# Patient Record
Sex: FEMALE | Race: WHITE | URBAN - METROPOLITAN AREA
[De-identification: names, ages, dates, MRNs, and addresses within clinical notes are randomized per-mention and may not be internally consistent; named-entity substitution may affect disease eponyms.]

---

## 2017-10-04 ENCOUNTER — HOSPITAL ENCOUNTER (INPATIENT)
Age: 32
LOS: 2 days | Discharge: HOME | DRG: 885 | End: 2017-10-06
Attending: PSYCHIATRY & NEUROLOGY | Admitting: PSYCHIATRY & NEUROLOGY

## 2017-10-04 PROBLEM — F23 BRIEF PSYCHOTIC DISORDER (HCC): Status: ACTIVE | Noted: 2017-10-04

## 2017-10-04 PROBLEM — F23 BRIEF PSYCHOTIC DISORDER (HCC): Chronic | Status: ACTIVE | Noted: 2017-10-04

## 2017-10-04 PROCEDURE — 1240000000 HC EMOTIONAL WELLNESS R&B

## 2017-10-04 PROCEDURE — 6370000000 HC RX 637 (ALT 250 FOR IP): Performed by: PSYCHIATRY & NEUROLOGY

## 2017-10-04 RX ORDER — TRAZODONE HYDROCHLORIDE 50 MG/1
50 TABLET ORAL NIGHTLY PRN
Status: DISCONTINUED | OUTPATIENT
Start: 2017-10-04 | End: 2017-10-06 | Stop reason: HOSPADM

## 2017-10-04 RX ORDER — MAGNESIUM HYDROXIDE/ALUMINUM HYDROXICE/SIMETHICONE 120; 1200; 1200 MG/30ML; MG/30ML; MG/30ML
30 SUSPENSION ORAL 3 TIMES DAILY PRN
Status: DISCONTINUED | OUTPATIENT
Start: 2017-10-04 | End: 2017-10-06 | Stop reason: HOSPADM

## 2017-10-04 RX ORDER — GABAPENTIN 300 MG/1
300 CAPSULE ORAL 3 TIMES DAILY
Status: DISCONTINUED | OUTPATIENT
Start: 2017-10-04 | End: 2017-10-06 | Stop reason: HOSPADM

## 2017-10-04 RX ORDER — HYDROXYZINE HYDROCHLORIDE 25 MG/1
25 TABLET, FILM COATED ORAL 3 TIMES DAILY PRN
Status: DISCONTINUED | OUTPATIENT
Start: 2017-10-04 | End: 2017-10-06 | Stop reason: HOSPADM

## 2017-10-04 RX ORDER — ACETAMINOPHEN 325 MG/1
325 TABLET ORAL EVERY 4 HOURS PRN
Status: DISCONTINUED | OUTPATIENT
Start: 2017-10-04 | End: 2017-10-06 | Stop reason: HOSPADM

## 2017-10-04 RX ADMIN — GABAPENTIN 300 MG: 300 CAPSULE ORAL at 17:13

## 2017-10-04 RX ADMIN — VORTIOXETINE 10 MG: 10 TABLET, FILM COATED ORAL at 17:12

## 2017-10-04 ASSESSMENT — SLEEP AND FATIGUE QUESTIONNAIRES
DIFFICULTY STAYING ASLEEP: YES
DIFFICULTY FALLING ASLEEP: YES
SLEEP PATTERN: DIFFICULTY FALLING ASLEEP;DIFFICULTY ARISING;RESTLESSNESS
DO YOU USE A SLEEP AID: NO
DO YOU HAVE DIFFICULTY SLEEPING: YES
DIFFICULTY ARISING: YES
RESTFUL SLEEP: NO
AVERAGE NUMBER OF SLEEP HOURS: 4

## 2017-10-04 ASSESSMENT — LIFESTYLE VARIABLES: HISTORY_ALCOHOL_USE: NO

## 2017-10-04 ASSESSMENT — PATIENT HEALTH QUESTIONNAIRE - PHQ9: SUM OF ALL RESPONSES TO PHQ QUESTIONS 1-9: 10

## 2017-10-04 NOTE — IP AVS SNAPSHOT
After Visit Summary    This summary was created for you. Thank you for entrusting your care to us. The following information includes details about your hospital/visit stay along with steps you should take to help with your recovery once you leave the hospital.  In this packet, you will find information about the topics listed below:    · Instructions about your medications including a list of your home medications  · A summary of your hospital visit  · Follow-up appointments once you have left the hospital  · Your care plan at home      You may receive a survey regarding the care you received during your stay. Your input is valuable to us. We encourage you to complete and return your survey in the envelope provided. We hope you will choose us in the future for your healthcare needs. Basic Information     Date Of Birth Sex Race Ethnicity Preferred Language    1985 Female White Non-/Non  English      Vital Signs     Blood Pressure Pulse Temperature Respirations Height Weight    116/95 96 97.9 °F (36.6 °C) (Axillary) 14 5' 7\" (1.702 m) 158 lb (71.7 kg)    Body Mass Index Smoking Status                24.75 kg/m2 Never Smoker          Care Provided at:     Name Address Phone       [address] [facility] [phone]       Psychiatric Advance Directive          Most Recent Value    Psychiatric Advance Directive  No    Power of  for [place]           Your Visit    Here you will find information about your visit, including the reason for your visit. Please take this sheet with you when you visit your doctor or other health care provider in the future. It will help determine the best possible medical care for you at that time. If you have any questions once you leave the hospital, please call the department phone number listed below.         Why you were here     Your primary diagnosis was:  Brief Psychotic Disorder Visit Information     Date & Time Provider Department Dept. Phone    10/4/2017 Edilson Hodges  [facility] [phone]       Follow-up Appointments    Below is a list of your follow-up and future appointments. This may not be a complete list as you may have made appointments directly with providers that we are not aware of or your providers may have made some for you. Please call your providers to confirm appointments. It is important to keep your appointments. Please bring your current insurance card, photo ID, co-pay, and all medication bottles to your appointment. If self-pay, payment is expected at the time of service. Follow-up Information     Follow up with Cheryl Hillman . Specialty:  Family Medicine    Contact information:    [address], [address]  [place]  [phone]          Follow up with [employer]  On 10/16/2017. Why:  @ 9 am for med check up    Contact information:    [place] [address]  [address] [phone]          Follow up with [employer] On 10/26/2017. Why:  @ 630 PM for psych evaul with Dr Kori Garland information:    [place] [place]  [phone]  [phone]           Care Plan Once You Return Home    This section includes instructions you will need to follow once you leave the hospital.  Your care team will discuss these with you, so you and those caring for you know how to best care for your health needs at home. This section may also include educational information about certain health topics that may be of help to you. Diet Instructions     ? Good nutrition is important when healing from an illness, injury, or surgery. Follow any nutrition recommendations given to you during your hospital stay. ? If you were given an oral nutrition supplement while in the hospital, continue to take this supplement at home.   You can take it with meals, provider(s). I had the opportunity to ask questions regarding this information. I understand I should dispose of my armband safely at home to protect my health information. A complete copy of the After Visit Summary has been given to me, the patient and/or responsible adult. Patient Signature/Responsible Adult:  ____________________________________________________________    Date/Time:  ____________________________________________________________                 Clinician Signature:  ____________________________________________________________    Date/Time:  ____________________________________________________________                 Transmitted to next level of Care:  ____________________________________________________________    Date/Time/Signature:  ____________________________________________________________            After Visit Summary  (Discharge Instructions)    Medication List for Home    Based on the information you provided to us as well as any changes during this visit, the following is your updated medication list.  Compare this with your prescription bottles at home. If you have any questions or concerns, contact your primary care physician's office. Daily Medication List (This medication list can be shared with any healthcare provider who is helping you manage your medications)      There are NEW medications for you. START taking them after you leave the hospital        Last Dose    Next Dose Due AM NOON PM NIGHT    Cariprazine HCl 3 MG Caps   Take 1 capsule by mouth daily   Notes to Patient:   To stabilize your mood and clear your thoughts                3 mg on 10/6/2017  8:49 AM     10/7/2016    9 AM                   gabapentin 300 MG capsule   Commonly known as:  NEURONTIN   Take 1 capsule by mouth 3 times daily   Notes to Patient:  For anxiety and to stabilize your mood                300 mg on 10/6/2017  8:46 AM     10/6/2017               2 PM VORTIoxetine 10 MG Tabs tablet   Commonly known as:  TRINTELLIX   Take 10 mg by mouth daily   Notes to Patient:  depression                10 mg on 10/6/2017  8:46 AM     10/7/2017    9 AM                     These are medications you told us you were taking at home, CONTINUE taking them after you leave the hospital        Last Dose    Next Dose Due AM NOON PM NIGHT    albuterol sulfate  (90 Base) MCG/ACT inhaler   Inhale 2 puffs into the lungs every 6 hours as needed for Wheezing   Notes to Patient:  wheezing                  You take this only as you need to                             Where to Get Your Medications      You can get these medications from any pharmacy     Bring a paper prescription for each of these medications     Cariprazine HCl 3 MG Caps    gabapentin 300 MG capsule    VORTIoxetine 10 MG Tabs tablet               Allergies as of 10/6/2017        Reactions    Maxalt [Rizatriptan Benzoate] Anaphylaxis      Immunizations as of 10/6/2017     No immunizations on file. [facility] Signup     [facility] allows you to send messages to your doctor, view your test results, renew your prescriptions, schedule appointments, view visit notes, and more. How Do I Sign Up? 1. In your Internet browser, go to [email or web address]  2. Click on the Sign Up Now link in the Sign In box. You will see the New Member Sign Up page. 3. Enter your [facility] Access Code exactly as it appears below. You will not need to use this code after youve completed the sign-up process. If you do not sign up before the expiration date, you must request a new code. [facility] Access Code: [email or web address]  Expires: 12/5/2017 10:59 AM    4. Enter your Social Security Number ([id number]) and Date of Birth (mm/dd/yyyy) as indicated and click Submit. You will be taken to the next sign-up page. 5. Create a [facility] ID.  This will be your [facility] login ID and cannot be changed, so think of one that is secure and easy to remember. 6. Create a [employer] password. You can change your password at any time. 7. Enter your Password Reset Question and Answer. This can be used at a later time if you forget your password. 8. Enter your e-mail address. You will receive e-mail notification when new information is available in [address] 9. Click Sign Up. You can now view your medical record. Additional Information  If you have questions, please contact the physician practice where you receive care. Remember, [employer] is NOT to be used for urgent needs. For medical emergencies, dial 911. For questions regarding your [employer] account call [phone]. If you have a clinical question, please call your doctor's office. View your information online  ? Review your current list of  medications, immunization, and allergies. ? Review your future test results online . ? Review your discharge instructions provided by your caregivers at discharge    Certain functionality such as prescription refills, scheduling appointments or sending messages to your provider are not activated if your provider does not use [employer] in his/her office    For questions regarding your [employer] account call [phone]. If you have a clinical question, please call your doctor's office.

## 2017-10-04 NOTE — BH NOTE
`[facility]  Admission Note     Admission Type:   Admission Type: Voluntary    Reason for admission:  Reason for Admission: Paranoid, delusional, poor sleep, poor appetite, anxiety    PATIENT STRENGTHS:  Strengths: Communication, No significant Physical Illness    Patient Strengths and Limitations:  Limitations: Difficulty problem solving/relies on others to help solve problems, Difficult relationships / poor social skills    Addictive Behavior:   Addictive Behavior  In the past 3 months, have you felt or has someone told you that you have a problem with:  : None  Do you have a history of Chemical Use?: No  Do you have a history of Alcohol Use?: No  Do you have a history of Street Drug Abuse?: No  Histroy of Prescripton Drug Abuse?: No    Medical Problems:   Past Medical History:   Diagnosis Date    Asthma        Status EXAM:  Status and Exam  Normal: No  Facial Expression: Worried, Sad  Affect: Blunt  Level of Consciousness: Alert  Mood:Normal: No  Mood: Depressed, Anxious, Helpless, Guilty  Motor Activity:Normal: Yes  Interview Behavior: Cooperative  Preception: [place] to Person, [place] to Time, [place] to Place, [place] to Situation  Attention:Normal: Yes  Thought Processes: Circumstantial  Thought Content:Normal: No  Thought Content: Paranoia, Preoccupations  Delusions: No  Memory:Normal: Yes  Insight and Judgment: No  Insight and Judgment: Poor Judgment  Present Suicidal Ideation: No  Present Homicidal Ideation: No    Tobacco Screening:  Practical Counseling, on admission, james X, if applicable and completed (first 3 are required if patient doesn't refuse):            ( )  Recognizing danger situations (included triggers and roadblocks)                    ( )  Coping skills (new ways to manage stress, exercise, relaxation techniques, changing routine, distraction)                                                           ( )  Basic information about quitting (benefits of quitting, techniques in how to quit, available resources  ( ) Referral for counseling faxed to [facility]                                           ( ) Patient refused counseling  (x ) Patient has not smoked in the last 30 days      Pt admitted with followings belongings:  Dentures: None  Vision - Corrective Lenses: None  Hearing Aid: None  Jewelry: None  Body Piercings Removed: N/A  Clothing: Footwear, Shirt, Socks, Undergarments (Comment)  Were All Patient Medications Collected?: Not Applicable  Other Valuables: None     Valuables sent home with none. Valuables placed in safe in security envelope, number:  none. Patient's home medications were continued. Patient oriented to surroundings and program expectations and copy of patient rights given. Received admission packet:  yes  Consents reviewed, signed Voluntary. Refused all others                     Patient verbalize understanding:  yes.     Patient education on precautions: yes                  Chad Servin RN

## 2017-10-05 LAB
ABSOLUTE EOS #: 0 K/UL (ref 0–0.4)
ABSOLUTE LYMPH #: 1.5 K/UL (ref 1–4.8)
ABSOLUTE MONO #: 0.5 K/UL (ref 0.1–1.3)
ALBUMIN SERPL-MCNC: 3.8 G/DL (ref 3.5–5.2)
ALBUMIN/GLOBULIN RATIO: NORMAL (ref 1–2.5)
ALP BLD-CCNC: 67 U/L (ref 35–104)
ALT SERPL-CCNC: 22 U/L (ref 5–33)
ANION GAP SERPL CALCULATED.3IONS-SCNC: 14 MMOL/L (ref 9–17)
AST SERPL-CCNC: 19 U/L
BASOPHILS # BLD: 0 %
BASOPHILS ABSOLUTE: 0 K/UL (ref 0–0.2)
BILIRUB SERPL-MCNC: 0.34 MG/DL (ref 0.3–1.2)
BUN BLDV-MCNC: 12 MG/DL (ref 6–20)
BUN/CREAT BLD: NORMAL (ref 9–20)
CALCIUM SERPL-MCNC: 8.9 MG/DL (ref 8.6–10.4)
CHLORIDE BLD-SCNC: 102 MMOL/L (ref 98–107)
CHOLESTEROL/HDL RATIO: 5.4
CHOLESTEROL: 151 MG/DL
CO2: 24 MMOL/L (ref 20–31)
CREAT SERPL-MCNC: 0.57 MG/DL (ref 0.5–0.9)
DIFFERENTIAL TYPE: ABNORMAL
EKG ATRIAL RATE: 84 BPM
EKG P AXIS: 59 DEGREES
EKG P-R INTERVAL: 160 MS
EKG Q-T INTERVAL: 364 MS
EKG QRS DURATION: 86 MS
EKG QTC CALCULATION (BAZETT): 430 MS
EKG R AXIS: 83 DEGREES
EKG T AXIS: 32 DEGREES
EKG VENTRICULAR RATE: 84 BPM
EOSINOPHILS RELATIVE PERCENT: 1 %
GFR AFRICAN AMERICAN: >60 ML/MIN
GFR NON-AFRICAN AMERICAN: >60 ML/MIN
GFR SERPL CREATININE-BSD FRML MDRD: NORMAL ML/MIN/{1.73_M2}
GFR SERPL CREATININE-BSD FRML MDRD: NORMAL ML/MIN/{1.73_M2}
GLUCOSE BLD-MCNC: 96 MG/DL (ref 70–99)
HCG QUALITATIVE: NEGATIVE
HCT VFR BLD CALC: 36.9 % (ref 36–46)
HDLC SERPL-MCNC: 28 MG/DL
HEMOGLOBIN: 12.2 G/DL (ref 12–16)
LDL CHOLESTEROL: 99 MG/DL (ref 0–130)
LYMPHOCYTES # BLD: 17 %
MCH RBC QN AUTO: 26 PG (ref 26–34)
MCHC RBC AUTO-ENTMCNC: 32.9 G/DL (ref 31–37)
MCV RBC AUTO: 78.9 FL (ref 80–100)
MONOCYTES # BLD: 6 %
PDW BLD-RTO: 16.7 % (ref 11.5–14.9)
PLATELET # BLD: 334 K/UL (ref 150–450)
PLATELET ESTIMATE: ABNORMAL
PMV BLD AUTO: 7.4 FL (ref 6–12)
POTASSIUM SERPL-SCNC: 4 MMOL/L (ref 3.7–5.3)
RBC # BLD: 4.68 M/UL (ref 4–5.2)
RBC # BLD: ABNORMAL 10*6/UL
SEG NEUTROPHILS: 76 %
SEGMENTED NEUTROPHILS ABSOLUTE COUNT: 6.8 K/UL (ref 1.3–9.1)
SODIUM BLD-SCNC: 140 MMOL/L (ref 135–144)
T3 TOTAL: 123 NG/DL (ref 80–200)
T4 TOTAL: 7.2 UG/DL (ref 4.5–12)
TOTAL PROTEIN: 7.1 G/DL (ref 6.4–8.3)
TRIGL SERPL-MCNC: 120 MG/DL
TSH SERPL DL<=0.05 MIU/L-ACNC: 1.35 MIU/L (ref 0.3–5)
VLDLC SERPL CALC-MCNC: ABNORMAL MG/DL (ref 1–30)
WBC # BLD: 8.9 K/UL (ref 3.5–11)
WBC # BLD: ABNORMAL 10*3/UL

## 2017-10-05 PROCEDURE — 84436 ASSAY OF TOTAL THYROXINE: CPT

## 2017-10-05 PROCEDURE — 6370000000 HC RX 637 (ALT 250 FOR IP): Performed by: PSYCHIATRY & NEUROLOGY

## 2017-10-05 PROCEDURE — 80061 LIPID PANEL: CPT

## 2017-10-05 PROCEDURE — 85025 COMPLETE CBC W/AUTO DIFF WBC: CPT

## 2017-10-05 PROCEDURE — 80053 COMPREHEN METABOLIC PANEL: CPT

## 2017-10-05 PROCEDURE — 84703 CHORIONIC GONADOTROPIN ASSAY: CPT

## 2017-10-05 PROCEDURE — 84443 ASSAY THYROID STIM HORMONE: CPT

## 2017-10-05 PROCEDURE — 84480 ASSAY TRIIODOTHYRONINE (T3): CPT

## 2017-10-05 PROCEDURE — 93005 ELECTROCARDIOGRAM TRACING: CPT

## 2017-10-05 PROCEDURE — 1240000000 HC EMOTIONAL WELLNESS R&B

## 2017-10-05 PROCEDURE — 36415 COLL VENOUS BLD VENIPUNCTURE: CPT

## 2017-10-05 RX ADMIN — GABAPENTIN 300 MG: 300 CAPSULE ORAL at 15:03

## 2017-10-05 RX ADMIN — HYDROXYZINE HYDROCHLORIDE 25 MG: 25 TABLET, FILM COATED ORAL at 15:03

## 2017-10-05 RX ADMIN — GABAPENTIN 300 MG: 300 CAPSULE ORAL at 08:30

## 2017-10-05 RX ADMIN — CARIPRAZINE 3 MG: 3 CAPSULE, GELATIN COATED ORAL at 08:30

## 2017-10-05 RX ADMIN — VORTIOXETINE 10 MG: 10 TABLET, FILM COATED ORAL at 08:30

## 2017-10-05 RX ADMIN — GABAPENTIN 300 MG: 300 CAPSULE ORAL at 20:39

## 2017-10-05 ASSESSMENT — SLEEP AND FATIGUE QUESTIONNAIRES
DIFFICULTY FALLING ASLEEP: NO
RESTFUL SLEEP: YES
DIFFICULTY STAYING ASLEEP: NO
DIFFICULTY ARISING: NO
AVERAGE NUMBER OF SLEEP HOURS: 7
DO YOU HAVE DIFFICULTY SLEEPING: NO
DO YOU USE A SLEEP AID: NO

## 2017-10-05 ASSESSMENT — PATIENT HEALTH QUESTIONNAIRE - PHQ9: SUM OF ALL RESPONSES TO PHQ QUESTIONS 1-9: 0

## 2017-10-05 ASSESSMENT — LIFESTYLE VARIABLES: HISTORY_ALCOHOL_USE: NO

## 2017-10-05 NOTE — H&P
HISTORY and [facility] [address]       NAME:  Emmy Bumpers  MRN: [id number]   YOB: 1985   Date: 10/5/2017   Age: 27 y.o. Gender: female     COMPLAINT AND PRESENT HISTORY:      Emmy Bumpers is 27 y.o.,  female, admitted because of brief psychotic disorder. Pt denies suicidal ideation, EHR states pt has SI with no specific plan. Pt denies any homicidal ideation. Pt denies a history of previous suicide attempts. Pt feels hopeless, helpless, worthless, lack of interest, loss of energy. Poor insight. Pt has poor sleep, loss of appetite, denies having poor concentration and memory. Pt denies any current auditory, visual or tactile hallucinations. Patients current stressors include work stress. Patient denies any current alcohol or substance abuse. Patient lives with , 3 children, and her mother. Pt states she has been compliant with her medications.     DIAGNOSTIC RESULTS   Labs:  CBC:   Recent Labs      10/05/17   0734   WBC  8.9   HGB  12.2   PLT  334     BMP:    Recent Labs      10/05/17   0734   NA  140   K  4.0   CL  102   CO2  24   BUN  12   CREATININE  0.57   GLUCOSE  96     Hepatic:   Recent Labs      10/05/17   0734   AST  19   ALT  22   BILITOT  0.34   ALKPHOS  79     Lipids:   Recent Labs      10/05/17   0734   CHOL  151   HDL  28*     PAST MEDICAL HISTORY     Past Medical History:   Diagnosis Date    Asthma     Migraine        Pt denies any history of Diabetes mellitus type 2, hypertension, stroke, heart disease, COPD, GERD, HLD, Cancer, Seizures,Thyroid disease, Kidney Disease, Hepatitis, TB.    SURGICAL HISTORY       Past Surgical History:   Procedure Laterality Date    CARPAL TUNNEL RELEASE Left 09/26/2016    [facility]     TUBAL LIGATION  2013       FAMILY HISTORY       Family History   Problem Relation Age of Onset    Hypertension Mother        SOCIAL HISTORY       Social History     Social History    Marital status: Legally      Spouse name: N/A    Number of children: N/A    Years of education: N/A     Social History Main Topics    Smoking status: Never Smoker    Smokeless tobacco: Never Used    Alcohol use Yes      Comment: occasionally    Drug use: No    Sexual activity: Yes     Other Topics Concern    None     Social History Narrative           REVIEW OF SYSTEMS      Allergies   Allergen Reactions    Maxalt [Rizatriptan Benzoate] Anaphylaxis       No current facility-administered medications on file prior to encounter. Current Outpatient Prescriptions on File Prior to Encounter   Medication Sig Dispense Refill    albuterol sulfate  (90 BASE) MCG/ACT inhaler Inhale 2 puffs into the lungs every 6 hours as needed for Wheezing                        General health:  Fairly good. No fever or chills. Skin:  No itching, redness or rash. Head, eyes, ears, nose, throat:  No headache, epistaxis, rhinorrhea hearing loss or sore throat. Neck:  No pain, stiffness or masses. Cardiovascular/Respiratory system:  No chest pain, palpitation, shortness of breath, coughing or expectoration. Gastrointestinal tract: No abdominal pain, nausea, vomiting, diarrhea or constipation. Genitourinary:  No burning on micturition. No hesitancy, urgency, frequency or discoloration of urine. Locomotor:  No bone or joint pains. No swelling or deformities. Neuropsychiatric:  See HPI. GENERAL PHYSICAL EXAM:     Vitals: /68  Pulse 79  Temp 97.5 °F (36.4 °C) (Axillary)   Resp 16  Ht 5' 7\" (1.702 m)  Wt 158 lb (71.7 kg)  BMI 24.75 kg/m2 Body mass index is 24.75 kg/(m^2). Pt was examined with a nurse present in the room. GENERAL APPEARANCE:  Victor Hugo Oakley is 27 y.o.,  female, mildly obese, nourished, conscious, alert. Does not appear to be distress or pain at this time. SKIN:  Warm, dry, no cyanosis or jaundice.     HEAD:  Normocephalic, atraumatic, no swelling or tenderness. Opening and closing mouth (CN V). EYES:  Pupils equal, reactive to light, Conjunctiva is clear, EOMs intact lorenzo (CN II, III, IV, VI), eyelids WNL. EARS:  No discharge, no marked hearing loss (CN VIII). NOSE:  No rhinorrhea, epistaxis or septal deformity. THROAT:  Not congested. No ulceration bleeding or discharge. NECK:  No stiffness, trachea central.  No palpable masses or L.N.      CHEST:  Symmetrical and equal on expansion. HEART:  Regular rate and rhythm. S1 > S2, No audible murmurs or gallops. LUNGS:  Equal on expansion, normal breath sounds. No adventitious sounds. ABDOMEN:  Obese. Soft on palpation. No localized tenderness. No guarding or rigidity. No palpable organomegaly. LYMPHATICS:  No palpable lymphadenopathy. LOCOMOTOR, BACK AND SPINE:  No tenderness or deformities. Pt able to rotate head and shrug shoulders (CN XI). EXTREMITIES:  Symmetrical, no pedal edema. Tianas sign negative. No discoloration or ulcerations. NEUROLOGIC:  The patient is conscious, alert, oriented, Gait and balance WNL. No apparent focal sensory deficits. No motor deficits, muscle strength equal Lorenzo. No facial droop (CN VII), tongue protrudes centrally (CN XII), no slurring of the speech (CN X). CN I and IX not tested.             PROVISIONAL DIAGNOSES:      Principal Problem:    Brief psychotic disorder      Catie Barrios PA-C on 10/5/2017 at 4:09 PM

## 2017-10-05 NOTE — BH NOTE
MD Fany   300 mg at 10/05/17 0830    Cariprazine HCl CAPS 3 mg  3 mg Oral Daily Ashley Grewal MD   3 mg at 10/05/17 0830     [unfilled]    H&P  Labs  Encourage to attend groups  Supportive therapy  ELOS: 5-7 days      Electronically signed by Ashley Grewal MD on 10/5/2017 at 9:21 AM

## 2017-10-05 NOTE — PROGRESS NOTES
Nutrition Assessment    Type and Reason for Visit: Positive Nutrition Screen (Unplanned weight loss and decreased appetite)    Nutrition Recommendations: Continue General diet. Consider oral nutrition supplement if po intakes are less than 50% at meals. Malnutrition Assessment:  · Malnutrition Status: Mild Malnutrition  · Context: Acute illness or injury  · Findings of the 6 clinical characteristics of malnutrition (Minimum of 2 out of 6 clinical characteristics is required to make the diagnosis of moderate or severe Protein Calorie Malnutrition based on AND/ASPEN Guidelines):  1. Energy Intake-Less than or equal to 50%, greater than or equal to 5 days    2. Weight Loss-7.5% loss or greater, in 1 year  3. Fat Loss-No significant subcutaneous fat loss,    4. Muscle Loss-No significant muscle mass loss,    5. Fluid Accumulation-No significant fluid accumulation, Extremities  6.  Strength-Not measured    Nutrition Diagnosis:   · Problem: Predicted suboptimal energy intake  · Etiology: related to Psychological cause/life stress     Signs and symptoms:  as evidenced by Diet history of poor intake    Nutrition Assessment:  · Subjective Assessment: Patient reports decreased appetite and sleep related to increased anxiety. · Wound Type: None  · Current Nutrition Therapies:  · Oral Diet Orders: General   · Oral Diet intake: Unable to assess  · Anthropometric Measures:  · Ht: 5' 7\" (170.2 cm)   · Current Body Wt: 158 lb (71.7 kg)  · Admission Body Wt: 158 lb (71.7 kg)  · Usual Body Wt: 170 lb (77.1 kg) (10/10/16)  · % Weight Change: 7.6%,  1 year  · Ideal Body Wt: 135 lb (61.2 kg), % Ideal Body 117%  · BMI Classification: BMI 18.5 - 24.9 Normal Weight  · Comparative Standards (Estimated Nutrition Needs):  · Estimated Daily Total Kcal: 9303-0807  · Estimated Daily Protein (g): 57-72    Estimated Intake vs Estimated Needs: Intake Less Than Needs    Nutrition Risk Level:  Moderate    Nutrition Interventions:

## 2017-10-05 NOTE — BH NOTE
Psychoeducation Group Note    Date: 10/5  Start Time: 1600  End Time: 1635    Number Participants in Group: 9    Goal:  Patient will demonstrate increased interpersonal interaction   Topic:  Coping Skills     Discipline Responsible:   OT  AT   x Nsg.  RT  Other       Participation Level:     None  Minimal    Active Listener x Interactive    Monopolizing         Participation Quality:  x Appropriate  Inappropriate          Attentive        Intrusive          Sharing        Resistant          Supportive        Lethargic       Affective:   x Congruent  Incongruent  Blunted  Flat    Constricted  Anxious  Elated  Angry    Labile  Depressed  Other         Cognitive:  x Alert x Oriented PPTP     Concentration  G x F  P   Attention Span  G x F  P   Short-Term Memory  G x F  P   Long-Term Memory  G x F  P   ProblemSolving/  Decision Making  G x F  P   Ability to Process  Information  G x F  P      Contributing Factors             Delusional             Hallucinating             Flight of Ideas             Other:       Modes of Intervention:   Education x Support  Exploration    Clarifying  Problem Solving  Confrontation    Socialization  Limit Setting  Reality Testing   x Activity  Movement  Media    Other:            Response to Learning:   Able to verbalize current knowledge/experience   x Able to verbalize/acknowledge new learning    Able to retain information    Capable of insight    Able to change behavior    Progressing to goal    Other:        Comments:
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
## 2017-10-05 NOTE — BH NOTE
Psychoeducation Group Note    Date: 10/5/17  Start Time:  1000 End Time: 1030    Number Participants in Group:  7    Goal:  Patient will demonstrate increased interpersonal interaction   Topic:   Physical Wellness - benefits of physical activity    Discipline Responsible:   OT  AT   x Nsg.  RT  Other       Participation Level:     None  Minimal    Active Listener x Interactive    Monopolizing         Participation Quality:  x Appropriate  Inappropriate          Attentive        Intrusive          Sharing        Resistant          Supportive        Lethargic       Affective:   x Congruent  Incongruent  Blunted  Flat    Constricted  Anxious  Elated  Angry    Labile  Depressed  Other         Cognitive:  x Alert x Oriented PPTP     Concentration  G x F  P   Attention Span  G x F  P   Short-Term Memory  G x F  P   Long-Term Memory  G x F  P   ProblemSolving/  Decision Making  G x F  P   Ability to Process  Information  G x F  P      Contributing Factors             Delusional             Hallucinating             Flight of Ideas             Other:       Modes of Intervention:  x Education  Support  Exploration   x Clarifying  Problem Solving  Confrontation    Socialization  Limit Setting  Reality Testing   x Activity x Movement  Media    Other:            Response to Learning:  x Able to verbalize current knowledge/experience    Able to verbalize/acknowledge new learning    Able to retain information    Capable of insight    Able to change behavior    Progressing to goal    Other:

## 2017-10-05 NOTE — PLAN OF CARE
Problem: Altered Mood, Depressive Behavior  Goal: LTG-Able to verbalize and/or display a decrease in depressive symptoms  Outcome: Ongoing  Psychoeducation Group Note     Date: 10/5/17              Start Time: 1000                    End Time: 1045     Number Participants in Group:  11     Goal:  Patient will demonstrate increased interpersonal interaction. Topic:  Creative espressions     Discipline Responsible:    OT   AT   [facility]. X RT   Other         Participation Level:                  None   Minimal   x Active Listener x Interactive     Monopolizing             Participation Quality:  x Appropriate   Inappropriate   x       Attentive         Intrusive   x       Sharing         Resistant           Supportive         Lethargic         Affective:         x Congruent   Incongruent   Blunted   Flat     Constricted   Anxious   Elated   Angry     Labile   Depressed   Other   Bright         Cognitive:  x Alert x Oriented PPTP       Concentration x G   F   P   Attention Span x G   F   P   Short-Term Memory   G   F   P   Long-Term Memory   G   F   P   ProblemSolving/  Decision Making x G   F   P   Ability to Process  Information x G   F   P         Contributing Factors              Delusional              Hallucinating              Flight of Ideas              Other:         Modes of Intervention:    Education x Support   Exploration   x Clarifying x Problem Solving x Confrontation   x Socialization   Limit Setting   Reality Testing   x Activity   Movement   Media     Other:                  Response to Learning:  x Able to verbalize current knowledge/experience   x Able to verbalize/acknowledge new learning     Able to retain information     Capable of insight   x Able to change behavior   x Progressing to goal     Other:          Comments: pt attended group and participated.

## 2017-10-05 NOTE — PLAN OF CARE
Problem: Altered Mood, Depressive Behavior  Goal: LTG-Able to verbalize and/or display a decrease in depressive symptoms  Pt admits to some depression and anxiety and upset that she cant sign herself out here. Denies SI. Social with peers. Attended group.

## 2017-10-05 NOTE — PLAN OF CARE
Problem: Altered Mood, Depressive Behavior  Goal: LTG-Able to verbalize and/or display a decrease in depressive symptoms  [facility]  Initial Interdisciplinary Treatment Plan NO        Original treatment plan Date & Time: 10/5/17  Admission Type:  Admission Type: Voluntary     Reason for admission:   Reason for Admission: Paranoid, delusional, poor sleep, poor appetite, anxiety     Estimated Length of Stay:  5-7days  Estimated Discharge Date: to be determined by physician     PATIENT STRENGTHS:  Patient Strengths:Strengths: Communication, No significant Physical Illness  Patient Strengths and Limitations:Limitations: Difficulty problem solving/relies on others to help solve problems, Difficult relationships / poor social skills  Addictive Behavior: Addictive Behavior  In the past 3 months, have you felt or has someone told you that you have a problem with:  : None  Do you have a history of Chemical Use?: No  Do you have a history of Alcohol Use?: No  Do you have a history of Street Drug Abuse?: No  Histroy of Prescripton Drug Abuse?: No  Medical Problems:  Past Medical History:   Diagnosis Date    Asthma       Status EXAM:Status and Exam  Normal: No  Facial Expression: Flat  Affect: Blunt  Level of Consciousness: Alert  Mood:Normal: No  Mood: Depressed, Anxious  Motor Activity:Normal: Yes  Interview Behavior: Cooperative  Preception: [place] to Person, [place] to Time, [place] to Place, [place] to Situation  Attention:Normal: Yes  Thought Processes: Circumstantial  Thought Content:Normal: No  Thought Content: Preoccupations  Hallucinations: None  Delusions: No  Memory:Normal: Yes  Insight and Judgment: No  Insight and Judgment: Poor Judgment, Poor Insight  Present Suicidal Ideation: No  Present Homicidal Ideation: No     EDUCATION:   Learner Progress Toward Treatment Goals: reviewed group plans and strategies for care     Method:group therapy, medication compliance, individualized assessments and care

## 2017-10-05 NOTE — PLAN OF CARE
Problem: Altered Mood, Depressive Behavior  Goal: STG-Absence of Self Harm  Psychoeducation Group Note     Date:10/5/17               Start Time:  1330                   End Time: 9835     Number Participants in Group:  11/16     Goal:  Patient will demonstrate increased interpersonal interaction   Topic: Learning how to cope with life crises      Discipline Responsible:    OT   AT   [facility]. X RT   Other         Participation Level:                  None   Minimal   X Active Listener X Interactive     Monopolizing             Participation Quality:  X Appropriate   Inappropriate   X       Attentive         Intrusive           Sharing         Resistant           Supportive         Lethargic         Affective:         X Congruent   Incongruent   Blunted   Flat     Constricted   Anxious   Elated   Angry     Labile   Depressed   Other             Cognitive:  X Alert X Oriented PPTP       Concentration X G   F   P   Attention Span X G   F   P   Short-Term Memory   G   F   P   Long-Term Memory   G   F   P   ProblemSolving/  Decision Making X G   F   P   Ability to Process  Information X G   F   P         Contributing Factors              Delusional              Hallucinating              Flight of Ideas              Other:         Modes of Intervention:  X Education X Support   Exploration     Clarifying X Problem Solving   Confrontation   X Socialization   Limit Setting   Reality Testing   X Activity   Movement   Media     Other:                  Response to Learning:  X Able to verbalize current knowledge/experience     Able to verbalize/acknowledge new learning   X Able to retain information   X Capable of insight     Able to change behavior   X Progressing to goal     Other:          Comments:

## 2017-10-06 VITALS
TEMPERATURE: 97.9 F | BODY MASS INDEX: 24.8 KG/M2 | SYSTOLIC BLOOD PRESSURE: 116 MMHG | WEIGHT: 158 LBS | HEART RATE: 96 BPM | HEIGHT: 67 IN | DIASTOLIC BLOOD PRESSURE: 95 MMHG | RESPIRATION RATE: 14 BRPM

## 2017-10-06 PROCEDURE — 5130000000 HC BRIDGE APPOINTMENT: Performed by: COUNSELOR

## 2017-10-06 PROCEDURE — 6370000000 HC RX 637 (ALT 250 FOR IP): Performed by: PSYCHIATRY & NEUROLOGY

## 2017-10-06 RX ORDER — GABAPENTIN 300 MG/1
300 CAPSULE ORAL 3 TIMES DAILY
Qty: 90 CAPSULE | Refills: 0 | Status: SHIPPED | OUTPATIENT
Start: 2017-10-06 | End: 2021-10-13

## 2017-10-06 RX ADMIN — GABAPENTIN 300 MG: 300 CAPSULE ORAL at 08:46

## 2017-10-06 RX ADMIN — VORTIOXETINE 10 MG: 10 TABLET, FILM COATED ORAL at 08:46

## 2017-10-06 RX ADMIN — CARIPRAZINE 3 MG: 3 CAPSULE, GELATIN COATED ORAL at 08:49

## 2017-10-06 NOTE — PLAN OF CARE
Problem: Altered Mood, Depressive Behavior  Goal: STG-Absence of Self Harm  Outcome: Completed Date Met:  10/06/17  Patient reports she has no suicidal or homicidal ideations. She denies any perceptual disturbances. She reports her thoughts are slowing down. She continues to have anxiety. She is ready for discharge.

## 2017-10-06 NOTE — DISCHARGE SUMMARY
Psychiatry: Discharge Note  Patient is stable. She  denies any hallucinations or delusions. She denies any suicidal or homicidal thoughts or plans. She agreed to follow up with ABDI[facility].   She verbalizes a plan to f/u with outpatient care and keep herself safe  Discharge diagnosis:  Axis I: Brief Psychotic disorder, GEE, OCD, Panic disorder  Axis II: none  Axis III :none  Axis IV : Lack of support  Axis V 45  Discharge Medications:   Current Discharge Medication List           Details   gabapentin (NEURONTIN) 300 MG capsule Take 1 capsule by mouth 3 times daily  Qty: 90 capsule, Refills: 0      VORTIoxetine (TRINTELLIX) 10 MG TABS tablet Take 10 mg by mouth daily  Qty: 30 tablet, Refills: 0      Cariprazine HCl 3 MG CAPS Take 1 capsule by mouth daily  Qty: 30 capsule, Refills: 0              Details   albuterol sulfate  (90 BASE) MCG/ACT inhaler Inhale 2 puffs into the lungs every 6 hours as needed for Wheezing           Electronically signed by Katie Albarran MD on 10/6/2017 at 10:42 AM

## 2017-10-06 NOTE — BH NOTE
[address]                [address] [facility], [address]                            PSYCHIATRIC EVALUATION    PATIENT NAME: Selma Israel                     :             1985  MED REC NO:   [id number]                               ROOM:            0119  ACCOUNT NO:   [de-identified]                            ADMISSION DATE:  10/04/2017  PROVIDER:     Manoj Soares      COMPREHENSIVE PSYCHIATRIC EVALUATION    HISTORY OF PRESENT ILLNESS AND REASON FOR CURRENT ADMISSION:  The  patient is a 76-year-old who is having panic attacks, anxiety and  agitation. She is afraid of going out of the house. She feels that  she is going to die after 24 hours because the number 24 is constantly  flashing in front of her eyes. She feels that she is having recurrent  intrusive thoughts about death. She feels that people are trying to  harm her and hurt her. She is not letting her three children go out  to school, as she is afraid that she is going to die, and she wants  them to be there when she is dying. PAST PSYCHIATRIC HISTORY:  She denies any past psychiatric problems. She said all the symptoms started less than a month ago, and she said  she was taking care of her children and working as a medical  assistant. She denies any drug and alcohol use. MEDICAL AND SURGICAL HISTORY:  She has a history of migraines. ALLERGIES:  She is allergic to [address] TRIPTANS. SHE SAYS SHE GETS ANAPHYLAXIS WITH IT.    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 Her mother is supportive. Her father is . She has two  brothers. She denies any physical, sexual or emotional abuse in her. She denies any family history of mental illness, suicidal, drug and  alcohol abuse. MENTAL STATUS EXAM:  The patient is tearful, feels afraid and fearful.   She has mildly increased psychomotor activity. She has adequate  rapport. Her speech is mildly pressured. Her mood subjectively sad,  objectively appears to have dysphoric mood and affect. Thought  process predominantly within normal limits. Thought content are of  panic attacks, anxiety and obsessions. She feels if she does not get  better, she is going to kill herself and has delusions about death. She denies any homicidal thoughts or plans. She is of average  intelligence. She is oriented to time, place and person. Her memory  to recent, remote and immediate events are within normal limits. She  has adequate attention and concentration. Her insight and judgment  are impaired. Her abstraction is concrete. DIAGNOSES:  1.  Brief psychotic disorder. 2.  Panic disorder with agoraphobia. 3.  Rule out obsessive compulsive disorder. 4.  Migraine headaches. TREATMENT AND PLAN:  Admit her, start her on medications, stabilize  her. ESTIMATED LENGTH OF STAY:  10 days.         Monae Jackson    D: 10/04/2017 18:38:56       T: 10/04/2017 23:44:43     [email or web address]  Job#: [id number]     Doc#: [id number]